# Patient Record
Sex: MALE | Race: WHITE | NOT HISPANIC OR LATINO | URBAN - METROPOLITAN AREA
[De-identification: names, ages, dates, MRNs, and addresses within clinical notes are randomized per-mention and may not be internally consistent; named-entity substitution may affect disease eponyms.]

---

## 2018-10-16 ENCOUNTER — INPATIENT (INPATIENT)
Facility: HOSPITAL | Age: 68
LOS: 0 days | Discharge: HOME | DRG: 247 | End: 2018-10-17
Attending: INTERNAL MEDICINE | Admitting: INTERNAL MEDICINE
Payer: COMMERCIAL

## 2018-10-16 VITALS
RESPIRATION RATE: 18 BRPM | DIASTOLIC BLOOD PRESSURE: 58 MMHG | OXYGEN SATURATION: 97 % | TEMPERATURE: 97 F | HEART RATE: 51 BPM | SYSTOLIC BLOOD PRESSURE: 127 MMHG

## 2018-10-16 DIAGNOSIS — Z95.5 PRESENCE OF CORONARY ANGIOPLASTY IMPLANT AND GRAFT: Chronic | ICD-10-CM

## 2018-10-16 DIAGNOSIS — E11.9 TYPE 2 DIABETES MELLITUS WITHOUT COMPLICATIONS: Chronic | ICD-10-CM

## 2018-10-16 DIAGNOSIS — R94.39 ABNORMAL RESULT OF OTHER CARDIOVASCULAR FUNCTION STUDY: ICD-10-CM

## 2018-10-16 DIAGNOSIS — I10 ESSENTIAL (PRIMARY) HYPERTENSION: Chronic | ICD-10-CM

## 2018-10-16 LAB
ANION GAP SERPL CALC-SCNC: 12 MMOL/L — SIGNIFICANT CHANGE UP (ref 5–17)
APTT BLD: 31.2 SEC — SIGNIFICANT CHANGE UP (ref 27.5–37.4)
BASOPHILS # BLD AUTO: 0 K/UL — SIGNIFICANT CHANGE UP (ref 0–0.2)
BASOPHILS NFR BLD AUTO: 0.7 % — SIGNIFICANT CHANGE UP (ref 0–2)
BLD GP AB SCN SERPL QL: SIGNIFICANT CHANGE UP
BUN SERPL-MCNC: 26 MG/DL — HIGH (ref 8–20)
CALCIUM SERPL-MCNC: 9.6 MG/DL — SIGNIFICANT CHANGE UP (ref 8.6–10.2)
CHLORIDE SERPL-SCNC: 99 MMOL/L — SIGNIFICANT CHANGE UP (ref 98–107)
CO2 SERPL-SCNC: 28 MMOL/L — SIGNIFICANT CHANGE UP (ref 22–29)
CREAT SERPL-MCNC: 0.89 MG/DL — SIGNIFICANT CHANGE UP (ref 0.5–1.3)
EOSINOPHIL # BLD AUTO: 0.2 K/UL — SIGNIFICANT CHANGE UP (ref 0–0.5)
EOSINOPHIL NFR BLD AUTO: 4 % — SIGNIFICANT CHANGE UP (ref 0–5)
GLUCOSE BLDC GLUCOMTR-MCNC: 148 MG/DL — HIGH (ref 70–99)
GLUCOSE BLDC GLUCOMTR-MCNC: 94 MG/DL — SIGNIFICANT CHANGE UP (ref 70–99)
GLUCOSE SERPL-MCNC: 164 MG/DL — HIGH (ref 70–115)
HCT VFR BLD CALC: 43.7 % — SIGNIFICANT CHANGE UP (ref 42–52)
HGB BLD-MCNC: 14 G/DL — SIGNIFICANT CHANGE UP (ref 14–18)
INR BLD: 1.02 RATIO — SIGNIFICANT CHANGE UP (ref 0.88–1.16)
LYMPHOCYTES # BLD AUTO: 1.5 K/UL — SIGNIFICANT CHANGE UP (ref 1–4.8)
LYMPHOCYTES # BLD AUTO: 24.1 % — SIGNIFICANT CHANGE UP (ref 20–55)
MCHC RBC-ENTMCNC: 28.4 PG — SIGNIFICANT CHANGE UP (ref 27–31)
MCHC RBC-ENTMCNC: 32 G/DL — SIGNIFICANT CHANGE UP (ref 32–36)
MCV RBC AUTO: 88.6 FL — SIGNIFICANT CHANGE UP (ref 80–94)
MONOCYTES # BLD AUTO: 0.7 K/UL — SIGNIFICANT CHANGE UP (ref 0–0.8)
MONOCYTES NFR BLD AUTO: 11.9 % — HIGH (ref 3–10)
NEUTROPHILS # BLD AUTO: 3.6 K/UL — SIGNIFICANT CHANGE UP (ref 1.8–8)
NEUTROPHILS NFR BLD AUTO: 59 % — SIGNIFICANT CHANGE UP (ref 37–73)
PLATELET # BLD AUTO: 289 K/UL — SIGNIFICANT CHANGE UP (ref 150–400)
POTASSIUM SERPL-MCNC: 4.4 MMOL/L — SIGNIFICANT CHANGE UP (ref 3.5–5.3)
POTASSIUM SERPL-SCNC: 4.4 MMOL/L — SIGNIFICANT CHANGE UP (ref 3.5–5.3)
PROTHROM AB SERPL-ACNC: 11.2 SEC — SIGNIFICANT CHANGE UP (ref 9.8–12.7)
RBC # BLD: 4.93 M/UL — SIGNIFICANT CHANGE UP (ref 4.6–6.2)
RBC # FLD: 13.1 % — SIGNIFICANT CHANGE UP (ref 11–15.6)
SODIUM SERPL-SCNC: 139 MMOL/L — SIGNIFICANT CHANGE UP (ref 135–145)
WBC # BLD: 6.1 K/UL — SIGNIFICANT CHANGE UP (ref 4.8–10.8)
WBC # FLD AUTO: 6.1 K/UL — SIGNIFICANT CHANGE UP (ref 4.8–10.8)

## 2018-10-16 PROCEDURE — 93010 ELECTROCARDIOGRAM REPORT: CPT | Mod: 76

## 2018-10-16 RX ORDER — ATORVASTATIN CALCIUM 80 MG/1
10 TABLET, FILM COATED ORAL AT BEDTIME
Qty: 0 | Refills: 0 | Status: DISCONTINUED | OUTPATIENT
Start: 2018-10-16 | End: 2018-10-17

## 2018-10-16 RX ORDER — DEXTROSE 50 % IN WATER 50 %
25 SYRINGE (ML) INTRAVENOUS ONCE
Qty: 0 | Refills: 0 | Status: DISCONTINUED | OUTPATIENT
Start: 2018-10-16 | End: 2018-10-17

## 2018-10-16 RX ORDER — METOPROLOL TARTRATE 50 MG
25 TABLET ORAL DAILY
Qty: 0 | Refills: 0 | Status: DISCONTINUED | OUTPATIENT
Start: 2018-10-16 | End: 2018-10-17

## 2018-10-16 RX ORDER — SODIUM CHLORIDE 9 MG/ML
1000 INJECTION, SOLUTION INTRAVENOUS
Qty: 0 | Refills: 0 | Status: DISCONTINUED | OUTPATIENT
Start: 2018-10-16 | End: 2018-10-17

## 2018-10-16 RX ORDER — DEXTROSE 50 % IN WATER 50 %
12.5 SYRINGE (ML) INTRAVENOUS ONCE
Qty: 0 | Refills: 0 | Status: DISCONTINUED | OUTPATIENT
Start: 2018-10-16 | End: 2018-10-17

## 2018-10-16 RX ORDER — ASPIRIN/CALCIUM CARB/MAGNESIUM 324 MG
325 TABLET ORAL DAILY
Qty: 0 | Refills: 0 | Status: DISCONTINUED | OUTPATIENT
Start: 2018-10-16 | End: 2018-10-17

## 2018-10-16 RX ORDER — CLOPIDOGREL BISULFATE 75 MG/1
75 TABLET, FILM COATED ORAL DAILY
Qty: 0 | Refills: 0 | Status: DISCONTINUED | OUTPATIENT
Start: 2018-10-16 | End: 2018-10-17

## 2018-10-16 RX ORDER — DEXTROSE 50 % IN WATER 50 %
15 SYRINGE (ML) INTRAVENOUS ONCE
Qty: 0 | Refills: 0 | Status: DISCONTINUED | OUTPATIENT
Start: 2018-10-16 | End: 2018-10-17

## 2018-10-16 RX ORDER — ASCORBIC ACID 60 MG
500 TABLET,CHEWABLE ORAL DAILY
Qty: 0 | Refills: 0 | Status: DISCONTINUED | OUTPATIENT
Start: 2018-10-16 | End: 2018-10-17

## 2018-10-16 RX ORDER — GLUCAGON INJECTION, SOLUTION 0.5 MG/.1ML
1 INJECTION, SOLUTION SUBCUTANEOUS ONCE
Qty: 0 | Refills: 0 | Status: DISCONTINUED | OUTPATIENT
Start: 2018-10-16 | End: 2018-10-17

## 2018-10-16 RX ORDER — SPIRONOLACTONE 25 MG/1
25 TABLET, FILM COATED ORAL DAILY
Qty: 0 | Refills: 0 | Status: DISCONTINUED | OUTPATIENT
Start: 2018-10-16 | End: 2018-10-17

## 2018-10-16 RX ORDER — SODIUM CHLORIDE 9 MG/ML
1000 INJECTION INTRAMUSCULAR; INTRAVENOUS; SUBCUTANEOUS
Qty: 0 | Refills: 0 | Status: COMPLETED | OUTPATIENT
Start: 2018-10-16 | End: 2018-10-16

## 2018-10-16 RX ORDER — LISINOPRIL 2.5 MG/1
20 TABLET ORAL DAILY
Qty: 0 | Refills: 0 | Status: DISCONTINUED | OUTPATIENT
Start: 2018-10-16 | End: 2018-10-17

## 2018-10-16 RX ORDER — FENTANYL CITRATE 50 UG/ML
25 INJECTION INTRAVENOUS ONCE
Qty: 0 | Refills: 0 | Status: DISCONTINUED | OUTPATIENT
Start: 2018-10-16 | End: 2018-10-17

## 2018-10-16 RX ORDER — ONDANSETRON 8 MG/1
4 TABLET, FILM COATED ORAL ONCE
Qty: 0 | Refills: 0 | Status: DISCONTINUED | OUTPATIENT
Start: 2018-10-16 | End: 2018-10-17

## 2018-10-16 RX ORDER — INSULIN LISPRO 100/ML
VIAL (ML) SUBCUTANEOUS
Qty: 0 | Refills: 0 | Status: DISCONTINUED | OUTPATIENT
Start: 2018-10-16 | End: 2018-10-17

## 2018-10-16 RX ADMIN — ATORVASTATIN CALCIUM 10 MILLIGRAM(S): 80 TABLET, FILM COATED ORAL at 22:38

## 2018-10-16 RX ADMIN — SODIUM CHLORIDE 30 MILLILITER(S): 9 INJECTION INTRAMUSCULAR; INTRAVENOUS; SUBCUTANEOUS at 12:19

## 2018-10-16 RX ADMIN — SODIUM CHLORIDE 30 MILLILITER(S): 9 INJECTION INTRAMUSCULAR; INTRAVENOUS; SUBCUTANEOUS at 15:33

## 2018-10-16 NOTE — DISCHARGE NOTE ADULT - NS AS ACTIVITY OBS
Walking-Indoors allowed/No Heavy lifting/straining/Do not drive or operate machinery/Showering allowed/Walking-Outdoors allowed

## 2018-10-16 NOTE — DISCHARGE NOTE ADULT - MEDICATION SUMMARY - MEDICATIONS TO TAKE
I will START or STAY ON the medications listed below when I get home from the hospital:    spironolactone 25 mg oral tablet  -- 1 tab(s) by mouth 2 times a day  -- Indication: For Hypertension    aspirin 325 mg oral tablet  -- 1 tab(s) by mouth once a day  -- Indication: For CAD (coronary artery disease)    metFORMIN 500 mg oral tablet  -- 1 tab(s) by mouth once a day (at bedtime)  -- Indication: For Diabetes.  May resume 10/19 in the am.    pioglitazone 30 mg oral tablet  -- 1 tab(s) by mouth once a day  -- Indication: For Diabetes    lovastatin 20 mg oral tablet  -- 1 tab(s) by mouth once a day  -- Indication: For HLD    quinapril-hydrochlorothiazide 20mg-25mg oral tablet  -- 1 tab(s) by mouth once a day  -- Indication: For HTN (hypertension)    clopidogrel 75 mg oral tablet  -- 1 tab(s) by mouth once a day  -- Indication: For CAD (coronary artery disease)    metoprolol succinate 25 mg oral tablet, extended release  -- 1 tab(s) by mouth once a day  -- Indication: For HTN (hypertension)    Vitamin B Complex oral tablet, extended release  -- 1 tab(s) by mouth once a day  -- Indication: For Supplement    ascorbic acid 500 mg oral tablet  -- 1 tab(s) by mouth once a day  -- Indication: For Supplment

## 2018-10-16 NOTE — DISCHARGE NOTE ADULT - PATIENT PORTAL LINK FT
You can access the [facility] Patient Portal, offered by [facility], by registering with the following website: [email or web address]

## 2018-10-16 NOTE — DISCHARGE NOTE ADULT - CARE PROVIDER_API CALL
Manjeet Stephens), Cardiovascular Disease; Interventional Cardiology  [address] [place] [address]  Phone: [phone]  Fax: [phone]

## 2018-10-16 NOTE — H&P PST ADULT - HISTORY OF PRESENT ILLNESS
68 yo male with pmhx CAD s/p stents, HTN, HLD, DM, CVA who presents for [facility]. He recently had an abnormal stress test with lateral ischemia. He had edema which was cleared with aldactone and now resolved. He needs pancreatic surgery and needs preop clearance. Denies chest pain, sob, palps.    ECHO: 10/4/2018: EF 70% Normal left ventricular systolic function, diastolic dysfunction, mild valvular abnormality.      Stress test: 9/2018: Small zone of decreased radiotracer uptake in high lateral segments.  EF 64%. Mid lateral ischemia.     Cardiac cath: 9/2006: LAD stent, mid lesion

## 2018-10-16 NOTE — DISCHARGE NOTE ADULT - HOSPITAL COURSE
S/P CELE to LAD, RFA with #6fr sheath 68 yo male with pmhx CAD s/p stents, HTN, HLD, DM, CVA who presents for [facility]. He recently had an abnormal stress test with lateral ischemia. He had edema which was cleared with aldactone and now resolved. He needs pancreatic surgery and needs preop clearance. Denies chest pain, sob, palps.    ECHO: 10/4/2018: EF 70% Normal left ventricular systolic function, diastolic dysfunction, mild valvular abnormality.      Stress test: 9/2018: Small zone of decreased radiotracer uptake in high lateral segments.  EF 64%. Mid lateral ischemia.       S/P CELE to LAD, RFA with #6fr sheath.  AM labs noted  12 lead EKG NSB HR 49  No acute ST changes.  Continue medications as prior including aspirin and plavix  Hold metformin for 2 days  Follow up with cardiology as outpatient in 7-10 days

## 2018-10-16 NOTE — CONSULT NOTE ADULT - SUBJECTIVE AND OBJECTIVE BOX
Patient is a 67y old  Male who presents with a chief complaint of dyspnea on exertion and abnormal nuclear stress test      HPI:  68 yo male with pmhx CAD s/p stents, HTN, HLD, DM, CVA who presents for evaluation of dyspnea on exertion. He recently had an abnormal stress test with lateral ischemia. He had edema which was cleared with aldactone and now resolved. He needs pancreatic surgery and needs preop clearance. Denies chest pain, sob, palps.    ECHO: 10/4/2018: EF 70% Normal left ventricular systolic function, diastolic dysfunction, mild valvular abnormality.      Stress test: 9/2018: Small zone of decreased radiotracer uptake in high lateral segments.  EF 64%. Mid lateral ischemia.     Cardiac cath: 9/2006: LAD stent, mid lesion (16 Oct 2018 11:02)      PAST MEDICAL & SURGICAL HISTORY:  Bell's palsy  CVA (cerebral vascular accident): x 3  Diabetes  CAD (coronary artery disease): stents x 2  Hypertension  HTN (hypertension)  DM (diabetes mellitus)  S/P coronary artery stent placement: x 2    Allergies    No Known Allergies    Intolerances        MEDICATIONS  (STANDING):  ascorbic acid 500 milliGRAM(s) Oral daily  aspirin 325 milliGRAM(s) Oral daily  atorvastatin 10 milliGRAM(s) Oral at bedtime  clopidogrel Tablet 75 milliGRAM(s) Oral daily  dextrose 5%. 1000 milliLiter(s) (50 mL/Hr) IV Continuous <Continuous>  dextrose 50% Injectable 12.5 Gram(s) IV Push once  dextrose 50% Injectable 25 Gram(s) IV Push once  dextrose 50% Injectable 25 Gram(s) IV Push once  fentaNYL    Injectable 25 MICROGram(s) IV Push once  insulin lispro (HumaLOG) corrective regimen sliding scale   SubCutaneous three times a day before meals  lisinopril 20 milliGRAM(s) Oral daily  metoprolol succinate ER 25 milliGRAM(s) Oral daily  ondansetron Injectable 4 milliGRAM(s) IV Push once  spironolactone 25 milliGRAM(s) Oral daily    MEDICATIONS  (PRN):  dextrose 40% Gel 15 Gram(s) Oral once PRN Blood Glucose LESS THAN 70 milliGRAM(s)/deciliter  glucagon  Injectable 1 milliGRAM(s) IntraMuscular once PRN Glucose LESS THAN 70 milligrams/deciliter    ascorbic acid 500 milliGRAM(s) Oral daily  aspirin 325 milliGRAM(s) Oral daily  atorvastatin 10 milliGRAM(s) Oral at bedtime  clopidogrel Tablet 75 milliGRAM(s) Oral daily  dextrose 40% Gel 15 Gram(s) Oral once PRN  dextrose 5%. 1000 milliLiter(s) IV Continuous <Continuous>  dextrose 50% Injectable 12.5 Gram(s) IV Push once  dextrose 50% Injectable 25 Gram(s) IV Push once  dextrose 50% Injectable 25 Gram(s) IV Push once  fentaNYL    Injectable 25 MICROGram(s) IV Push once  glucagon  Injectable 1 milliGRAM(s) IntraMuscular once PRN  insulin lispro (HumaLOG) corrective regimen sliding scale   SubCutaneous three times a day before meals  lisinopril 20 milliGRAM(s) Oral daily  metoprolol succinate ER 25 milliGRAM(s) Oral daily  ondansetron Injectable 4 milliGRAM(s) IV Push once  spironolactone 25 milliGRAM(s) Oral daily      FAMILY HISTORY:      SOCIAL HISTORY:    CIGARETTES: Former    ALCOHOL: Rare    REVIEW OF SYSTEMS:  CONSTITUTIONAL: No fever, weight loss, or fatigue  EYES: No eye pain, visual disturbances, or discharge  ENMT:  No difficulty hearing, tinnitus, vertigo; No sinus or throat pain  NECK: No pain or stiffness  RESPIRATORY: No cough, wheezing, chills or hemoptysis; No Shortness of Breath  CARDIOVASCULAR: No chest pain, palpitations, passing out, dizziness, or leg swelling  GASTROINTESTINAL: No abdominal or epigastric pain. No nausea, vomiting, or hematemesis; No diarrhea or constipation. No melena or hematochezia.  GENITOURINARY: No dysuria, frequency, hematuria, or incontinence  NEUROLOGICAL: No headaches, memory loss, loss of strength, numbness, or tremors  SKIN: No itching, burning, rashes, or lesions   LYMPH Nodes: No enlarged glands  ENDOCRINE: No heat or cold intolerance; No hair loss  MUSCULOSKELETAL: No joint pain or swelling; No muscle, back, or extremity pain  PSYCHIATRIC: No depression, anxiety, mood swings, or difficulty sleeping  HEME/LYMPH: No easy bruising, or bleeding gums  ALLERY AND IMMUNOLOGIC: No hives or eczema	    Vital Signs Last 24 Hrs  T(C): 36.2 (16 Oct 2018 10:23), Max: 36.2 (16 Oct 2018 10:23)  T(F): 97.2 (16 Oct 2018 10:23), Max: 97.2 (16 Oct 2018 10:23)  HR: 57 (16 Oct 2018 19:05) (51 - 78)  BP: 101/56 (16 Oct 2018 19:05) (101/56 - 143/63)  BP(mean): --  RR: 18 (16 Oct 2018 19:05) (18 - 18)  SpO2: 93% (16 Oct 2018 19:05) (93% - 98%)    Daily     Daily     I&O's Detail      PHYSICAL EXAM:  Appearance: Normal, well nourished	  HEENT:   Normal oral mucosa, PERRL, EOMI, sclera non-icteric	  Lymphatic: No cervical lymphadenopathy  Cardiovascular: Normal S1 S2, No JVD, No cardiac murmurs, No carotid bruits, No peripheral edema  Respiratory: Lungs clear to auscultation	  Psychiatry: A & O x 3, Mood & affect appropriate  Gastrointestinal:  Soft, Non-tender, + BS, no bruits	  Skin: No rashes, No ecchymoses, No cyanosis  Neurologic: Grossly non-focal with full strength in all four extremities  Extremities: Normal range of motion, No clubbing, cyanosis or edema  Vascular: Peripheral pulses palpable 2+ bilaterally      LABS:                        14.0   6.1   )-----------( 289      ( 16 Oct 2018 10:35 )             43.7     10-16    139  |  99  |  26.0<H>  ----------------------------<  164<H>  4.4   |  28.0  |  0.89    Ca    9.6      16 Oct 2018 10:35          PT/INR - ( 16 Oct 2018 10:35 )   PT: 11.2 sec;   INR: 1.02 ratio         PTT - ( 16 Oct 2018 10:35 )  PTT:31.2 sec    I&O's Summary    BNP  RADIOLOGY & ADDITIONAL STUDIES:    Assessment:  68 yo male with pmhx CAD s/p stents, HTN, HLD, DM, CVA who presents for evaluation of dyspnea on exertion. He recently had an abnormal stress test with lateral ischemia. He had edema which was cleared with aldactone and now resolved. He needs pancreatic surgery and needs preop clearance. Denies chest pain, sob, palps.      Plan:  Cardiac catheterization and possible percutaneous intervention recommended.  Risks, benefits, and alternatives reviewed.  Risks including but not limited to MI, death, stroke, bleeding, infection, vessel injury, hematoma, renal failure, allergic reaction, urgent open heart surgery, restenosis and stent thrombosis were reviewed.  All questions answered.  Patient is agreeable to proceed.

## 2018-10-16 NOTE — PROGRESS NOTE ADULT - SUBJECTIVE AND OBJECTIVE BOX
Subjective:  67y  Male S/P LHC CELE to LAD, RFA with #6fr sheath sutured in place. Patient awake and alert without complaints./ Denies chest pain, sob, palps.    PAST MEDICAL & SURGICAL HISTORY:  Bell's palsy  CVA (cerebral vascular accident): x 3  Diabetes  CAD (coronary artery disease): stents x 2  Hypertension  HTN (hypertension)  DM (diabetes mellitus)  S/P coronary artery stent placement: x 2    FAMILY HISTORY:    MEDICATIONS  (STANDING):  ascorbic acid 500 milliGRAM(s) Oral daily  aspirin 325 milliGRAM(s) Oral daily  atorvastatin 10 milliGRAM(s) Oral at bedtime  clopidogrel Tablet 75 milliGRAM(s) Oral daily  dextrose 5%. 1000 milliLiter(s) (50 mL/Hr) IV Continuous <Continuous>  dextrose 50% Injectable 12.5 Gram(s) IV Push once  dextrose 50% Injectable 25 Gram(s) IV Push once  dextrose 50% Injectable 25 Gram(s) IV Push once  insulin lispro (HumaLOG) corrective regimen sliding scale   SubCutaneous three times a day before meals  lisinopril 20 milliGRAM(s) Oral daily  metoprolol succinate ER 25 milliGRAM(s) Oral daily  spironolactone 25 milliGRAM(s) Oral daily        General: No fatigue, no fevers/chills  Respiratory: No dyspnea, no cough, no wheeze  CV: No chest pain, no palpitations  Abd: No nausea  Neuro: No headache, no dizziness  No Known Allergies      Objective:  Vital Signs Last 24 Hrs  T(C): 36.2 (16 Oct 2018 10:23), Max: 36.2 (16 Oct 2018 10:23)  T(F): 97.2 (16 Oct 2018 10:23), Max: 97.2 (16 Oct 2018 10:23)  HR: 68 (16 Oct 2018 15:41) (51 - 72)  BP: 115/58 (16 Oct 2018 15:41) (115/58 - 143/63)  BP(mean): --  RR: 18 (16 Oct 2018 15:41) (18 - 18)  SpO2: 98% (16 Oct 2018 15:41) (97% - 98%)    Neuro: A&OX3  Lungs: CTA B/L  CV: S1, S2, no murmur, RRR  Abd: Soft  Right Groin: Soft, no bleeding, no hematoma, no bruit #6fr sheath in place  Extremity: + distal pulses  EKG: NSR 1st degree AVB 77bpm                        14.0   6.1   )-----------( 289      ( 16 Oct 2018 10:35 )             43.7     10-16    139  |  99  |  26.0<H>  ----------------------------<  164<H>  4.4   |  28.0  |  0.89    Ca    9.6      16 Oct 2018 10:35      PT/INR - ( 16 Oct 2018 10:35 )   PT: 11.2 sec;   INR: 1.02 ratio         PTT - ( 16 Oct 2018 10:35 )  PTT:31.2 sec    A/P: CAD s/p PCI: CELE to LAD  1.                 Groin management discussed with patient.  2.                 Post procedure EKG reviewed and stable.    3.                 Pt given instructions on importance of taking antiplatelet medication.    4.                 Aspirin/Plavix/Statin/BB/ACE  5.                 Follow up with cardiologist in 2 weeks or sooner if needed  6.                 Bedrest x 6  hours  7.                 Remove sheath in 2 hours  8.                 Probable discharge in am

## 2018-10-16 NOTE — PROGRESS NOTE ADULT - SUBJECTIVE AND OBJECTIVE BOX
Removal of Femoral Sheath    Pulses in the right lower extremity are palpable. The patient was placed in the supine position. The insertion site was identified and the sutures were removed per protocol.  The _#6___ [place] femoral sheath was then removed. Direct pressure was applied for  __20____ minutes.     Monitoring of the right groin and both lower extremities including neuro-vascular checks and vital signs every 15 minutes x 4, then every 30 minutes x 2, then every 1 hour was ordered.    Complications: None/Other    Comments:

## 2018-10-16 NOTE — ASU PATIENT PROFILE, ADULT - PMH
Bell's palsy    CAD (coronary artery disease)  stents x 2  CVA (cerebral vascular accident)  x 3  Diabetes    Hypertension

## 2018-10-17 VITALS
OXYGEN SATURATION: 97 % | HEART RATE: 58 BPM | DIASTOLIC BLOOD PRESSURE: 61 MMHG | SYSTOLIC BLOOD PRESSURE: 128 MMHG | RESPIRATION RATE: 18 BRPM

## 2018-10-17 LAB
ANION GAP SERPL CALC-SCNC: 9 MMOL/L — SIGNIFICANT CHANGE UP (ref 5–17)
BASOPHILS # BLD AUTO: 0 K/UL — SIGNIFICANT CHANGE UP (ref 0–0.2)
BASOPHILS NFR BLD AUTO: 0.9 % — SIGNIFICANT CHANGE UP (ref 0–2)
BUN SERPL-MCNC: 19 MG/DL — SIGNIFICANT CHANGE UP (ref 8–20)
CALCIUM SERPL-MCNC: 9 MG/DL — SIGNIFICANT CHANGE UP (ref 8.6–10.2)
CHLORIDE SERPL-SCNC: 103 MMOL/L — SIGNIFICANT CHANGE UP (ref 98–107)
CO2 SERPL-SCNC: 26 MMOL/L — SIGNIFICANT CHANGE UP (ref 22–29)
CREAT SERPL-MCNC: 0.82 MG/DL — SIGNIFICANT CHANGE UP (ref 0.5–1.3)
EOSINOPHIL # BLD AUTO: 0.2 K/UL — SIGNIFICANT CHANGE UP (ref 0–0.5)
EOSINOPHIL NFR BLD AUTO: 3.7 % — SIGNIFICANT CHANGE UP (ref 0–5)
GLUCOSE BLDC GLUCOMTR-MCNC: 138 MG/DL — HIGH (ref 70–99)
GLUCOSE SERPL-MCNC: 200 MG/DL — HIGH (ref 70–115)
HBA1C BLD-MCNC: 7.4 % — HIGH (ref 4–5.6)
HCT VFR BLD CALC: 43 % — SIGNIFICANT CHANGE UP (ref 42–52)
HGB BLD-MCNC: 13.7 G/DL — LOW (ref 14–18)
LYMPHOCYTES # BLD AUTO: 1.5 K/UL — SIGNIFICANT CHANGE UP (ref 1–4.8)
LYMPHOCYTES # BLD AUTO: 27 % — SIGNIFICANT CHANGE UP (ref 20–55)
MCHC RBC-ENTMCNC: 28 PG — SIGNIFICANT CHANGE UP (ref 27–31)
MCHC RBC-ENTMCNC: 31.9 G/DL — LOW (ref 32–36)
MCV RBC AUTO: 87.9 FL — SIGNIFICANT CHANGE UP (ref 80–94)
MONOCYTES # BLD AUTO: 0.6 K/UL — SIGNIFICANT CHANGE UP (ref 0–0.8)
MONOCYTES NFR BLD AUTO: 10.3 % — HIGH (ref 3–10)
NEUTROPHILS # BLD AUTO: 3.3 K/UL — SIGNIFICANT CHANGE UP (ref 1.8–8)
NEUTROPHILS NFR BLD AUTO: 57.9 % — SIGNIFICANT CHANGE UP (ref 37–73)
PLATELET # BLD AUTO: 251 K/UL — SIGNIFICANT CHANGE UP (ref 150–400)
POTASSIUM SERPL-MCNC: 4.4 MMOL/L — SIGNIFICANT CHANGE UP (ref 3.5–5.3)
POTASSIUM SERPL-SCNC: 4.4 MMOL/L — SIGNIFICANT CHANGE UP (ref 3.5–5.3)
RBC # BLD: 4.89 M/UL — SIGNIFICANT CHANGE UP (ref 4.6–6.2)
RBC # FLD: 13.2 % — SIGNIFICANT CHANGE UP (ref 11–15.6)
SODIUM SERPL-SCNC: 138 MMOL/L — SIGNIFICANT CHANGE UP (ref 135–145)
WBC # BLD: 5.6 K/UL — SIGNIFICANT CHANGE UP (ref 4.8–10.8)
WBC # FLD AUTO: 5.6 K/UL — SIGNIFICANT CHANGE UP (ref 4.8–10.8)

## 2018-10-17 PROCEDURE — 93010 ELECTROCARDIOGRAM REPORT: CPT

## 2018-10-17 PROCEDURE — 85610 PROTHROMBIN TIME: CPT

## 2018-10-17 PROCEDURE — C9600: CPT | Mod: LD

## 2018-10-17 PROCEDURE — C1725: CPT

## 2018-10-17 PROCEDURE — C1874: CPT

## 2018-10-17 PROCEDURE — 93458 L HRT ARTERY/VENTRICLE ANGIO: CPT | Mod: XU

## 2018-10-17 PROCEDURE — 99152 MOD SED SAME PHYS/QHP 5/>YRS: CPT

## 2018-10-17 PROCEDURE — 83036 HEMOGLOBIN GLYCOSYLATED A1C: CPT

## 2018-10-17 PROCEDURE — 82962 GLUCOSE BLOOD TEST: CPT

## 2018-10-17 PROCEDURE — 85027 COMPLETE CBC AUTOMATED: CPT

## 2018-10-17 PROCEDURE — 86850 RBC ANTIBODY SCREEN: CPT

## 2018-10-17 PROCEDURE — 86900 BLOOD TYPING SEROLOGIC ABO: CPT

## 2018-10-17 PROCEDURE — 93005 ELECTROCARDIOGRAM TRACING: CPT

## 2018-10-17 PROCEDURE — 85730 THROMBOPLASTIN TIME PARTIAL: CPT

## 2018-10-17 PROCEDURE — 99153 MOD SED SAME PHYS/QHP EA: CPT

## 2018-10-17 PROCEDURE — 80048 BASIC METABOLIC PNL TOTAL CA: CPT

## 2018-10-17 PROCEDURE — C1887: CPT

## 2018-10-17 PROCEDURE — 86901 BLOOD TYPING SEROLOGIC RH(D): CPT

## 2018-10-17 PROCEDURE — C1769: CPT

## 2018-10-17 PROCEDURE — 36415 COLL VENOUS BLD VENIPUNCTURE: CPT

## 2018-10-17 RX ORDER — METFORMIN HYDROCHLORIDE 850 MG/1
0 TABLET ORAL
Qty: 0 | Refills: 0 | COMMUNITY

## 2018-10-17 RX ORDER — SPIRONOLACTONE 25 MG/1
0 TABLET, FILM COATED ORAL
Qty: 0 | Refills: 0 | COMMUNITY

## 2018-10-17 RX ORDER — PIOGLITAZONE HYDROCHLORIDE 15 MG/1
1 TABLET ORAL
Qty: 0 | Refills: 0 | COMMUNITY
Start: 2018-10-17

## 2018-10-17 RX ORDER — CLOPIDOGREL BISULFATE 75 MG/1
1 TABLET, FILM COATED ORAL
Qty: 0 | Refills: 0 | COMMUNITY

## 2018-10-17 RX ORDER — METOPROLOL TARTRATE 50 MG
1 TABLET ORAL
Qty: 0 | Refills: 0 | COMMUNITY

## 2018-10-17 RX ORDER — ASCORBIC ACID 60 MG
1 TABLET,CHEWABLE ORAL
Qty: 0 | Refills: 0 | COMMUNITY
Start: 2018-10-17

## 2018-10-17 RX ORDER — ASPIRIN/CALCIUM CARB/MAGNESIUM 324 MG
1 TABLET ORAL
Qty: 0 | Refills: 0 | COMMUNITY

## 2018-10-17 RX ORDER — METFORMIN HYDROCHLORIDE 850 MG/1
1 TABLET ORAL
Qty: 0 | Refills: 0 | COMMUNITY
Start: 2018-10-17

## 2018-10-17 RX ORDER — PIOGLITAZONE HYDROCHLORIDE 15 MG/1
1 TABLET ORAL
Qty: 0 | Refills: 0 | COMMUNITY

## 2018-10-17 RX ORDER — SPIRONOLACTONE 25 MG/1
1 TABLET, FILM COATED ORAL
Qty: 0 | Refills: 0 | COMMUNITY
Start: 2018-10-17

## 2018-10-17 RX ORDER — BENAZEPRIL HYDROCHLORIDE AND HYDROCHLOROTHIAZIDE 10; 12.5 MG/1; MG/1
1 TABLET, FILM COATED ORAL
Qty: 0 | Refills: 0 | COMMUNITY

## 2018-10-17 RX ORDER — LOVASTATIN 20 MG
1 TABLET ORAL
Qty: 0 | Refills: 0 | COMMUNITY
Start: 2018-10-17

## 2018-10-17 RX ORDER — CLOPIDOGREL BISULFATE 75 MG/1
1 TABLET, FILM COATED ORAL
Qty: 0 | Refills: 0 | COMMUNITY
Start: 2018-10-17

## 2018-10-17 RX ORDER — METOPROLOL TARTRATE 50 MG
1 TABLET ORAL
Qty: 0 | Refills: 0 | COMMUNITY
Start: 2018-10-17

## 2018-10-17 RX ORDER — LOVASTATIN 20 MG
1 TABLET ORAL
Qty: 0 | Refills: 0 | COMMUNITY

## 2018-10-17 RX ORDER — ASCORBIC ACID 60 MG
1 TABLET,CHEWABLE ORAL
Qty: 0 | Refills: 0 | COMMUNITY

## 2018-10-17 RX ORDER — ASPIRIN/CALCIUM CARB/MAGNESIUM 324 MG
1 TABLET ORAL
Qty: 0 | Refills: 0 | COMMUNITY
Start: 2018-10-17

## 2018-10-17 RX ADMIN — SPIRONOLACTONE 25 MILLIGRAM(S): 25 TABLET, FILM COATED ORAL at 06:15

## 2018-10-17 RX ADMIN — LISINOPRIL 20 MILLIGRAM(S): 2.5 TABLET ORAL at 06:15

## 2018-10-18 PROBLEM — I10 ESSENTIAL (PRIMARY) HYPERTENSION: Chronic | Status: ACTIVE | Noted: 2018-10-16

## 2018-10-18 PROBLEM — I25.10 ATHEROSCLEROTIC HEART DISEASE OF NATIVE CORONARY ARTERY WITHOUT ANGINA PECTORIS: Chronic | Status: ACTIVE | Noted: 2018-10-16

## 2018-10-18 PROBLEM — G51.0 BELL'S PALSY: Chronic | Status: ACTIVE | Noted: 2018-10-16

## 2018-10-18 PROBLEM — I63.9 CEREBRAL INFARCTION, UNSPECIFIED: Chronic | Status: ACTIVE | Noted: 2018-10-16

## 2018-10-18 PROBLEM — E11.9 TYPE 2 DIABETES MELLITUS WITHOUT COMPLICATIONS: Chronic | Status: ACTIVE | Noted: 2018-10-16

## 2018-10-23 ENCOUNTER — INPATIENT (INPATIENT)
Facility: HOSPITAL | Age: 68
LOS: 0 days | Discharge: HOME | DRG: 247 | End: 2018-10-24
Attending: INTERNAL MEDICINE | Admitting: INTERNAL MEDICINE
Payer: COMMERCIAL

## 2018-10-23 VITALS
SYSTOLIC BLOOD PRESSURE: 122 MMHG | OXYGEN SATURATION: 97 % | RESPIRATION RATE: 20 BRPM | TEMPERATURE: 98 F | DIASTOLIC BLOOD PRESSURE: 56 MMHG | HEART RATE: 50 BPM

## 2018-10-23 DIAGNOSIS — I10 ESSENTIAL (PRIMARY) HYPERTENSION: Chronic | ICD-10-CM

## 2018-10-23 DIAGNOSIS — I25.10 ATHEROSCLEROTIC HEART DISEASE OF NATIVE CORONARY ARTERY WITHOUT ANGINA PECTORIS: ICD-10-CM

## 2018-10-23 DIAGNOSIS — E11.9 TYPE 2 DIABETES MELLITUS WITHOUT COMPLICATIONS: Chronic | ICD-10-CM

## 2018-10-23 DIAGNOSIS — R07.9 CHEST PAIN, UNSPECIFIED: ICD-10-CM

## 2018-10-23 DIAGNOSIS — Z95.5 PRESENCE OF CORONARY ANGIOPLASTY IMPLANT AND GRAFT: Chronic | ICD-10-CM

## 2018-10-23 LAB
ANION GAP SERPL CALC-SCNC: 11 MMOL/L — SIGNIFICANT CHANGE UP (ref 5–17)
ANION GAP SERPL CALC-SCNC: 13 MMOL/L — SIGNIFICANT CHANGE UP (ref 5–17)
BLD GP AB SCN SERPL QL: SIGNIFICANT CHANGE UP
BUN SERPL-MCNC: 31 MG/DL — HIGH (ref 8–20)
BUN SERPL-MCNC: 31 MG/DL — HIGH (ref 8–20)
CALCIUM SERPL-MCNC: 10.2 MG/DL — SIGNIFICANT CHANGE UP (ref 8.6–10.2)
CALCIUM SERPL-MCNC: 9.4 MG/DL — SIGNIFICANT CHANGE UP (ref 8.6–10.2)
CHLORIDE SERPL-SCNC: 101 MMOL/L — SIGNIFICANT CHANGE UP (ref 98–107)
CHLORIDE SERPL-SCNC: 104 MMOL/L — SIGNIFICANT CHANGE UP (ref 98–107)
CO2 SERPL-SCNC: 24 MMOL/L — SIGNIFICANT CHANGE UP (ref 22–29)
CO2 SERPL-SCNC: 25 MMOL/L — SIGNIFICANT CHANGE UP (ref 22–29)
CREAT SERPL-MCNC: 0.8 MG/DL — SIGNIFICANT CHANGE UP (ref 0.5–1.3)
CREAT SERPL-MCNC: 0.92 MG/DL — SIGNIFICANT CHANGE UP (ref 0.5–1.3)
GLUCOSE BLDC GLUCOMTR-MCNC: 91 MG/DL — SIGNIFICANT CHANGE UP (ref 70–99)
GLUCOSE SERPL-MCNC: 138 MG/DL — HIGH (ref 70–115)
GLUCOSE SERPL-MCNC: 147 MG/DL — HIGH (ref 70–115)
HCT VFR BLD CALC: 45 % — SIGNIFICANT CHANGE UP (ref 42–52)
HGB BLD-MCNC: 14.6 G/DL — SIGNIFICANT CHANGE UP (ref 14–18)
INR BLD: 0.99 RATIO — SIGNIFICANT CHANGE UP (ref 0.88–1.16)
MCHC RBC-ENTMCNC: 27.5 PG — SIGNIFICANT CHANGE UP (ref 27–31)
MCHC RBC-ENTMCNC: 32.4 G/DL — SIGNIFICANT CHANGE UP (ref 32–36)
MCV RBC AUTO: 84.9 FL — SIGNIFICANT CHANGE UP (ref 80–94)
PLATELET # BLD AUTO: 300 K/UL — SIGNIFICANT CHANGE UP (ref 150–400)
POTASSIUM SERPL-MCNC: 4.4 MMOL/L — SIGNIFICANT CHANGE UP (ref 3.5–5.3)
POTASSIUM SERPL-MCNC: 5.8 MMOL/L — HIGH (ref 3.5–5.3)
POTASSIUM SERPL-SCNC: 4.4 MMOL/L — SIGNIFICANT CHANGE UP (ref 3.5–5.3)
POTASSIUM SERPL-SCNC: 5.8 MMOL/L — HIGH (ref 3.5–5.3)
PROTHROM AB SERPL-ACNC: 10.9 SEC — SIGNIFICANT CHANGE UP (ref 9.8–12.7)
RBC # BLD: 5.3 M/UL — SIGNIFICANT CHANGE UP (ref 4.6–6.2)
RBC # FLD: 13.3 % — SIGNIFICANT CHANGE UP (ref 11–15.6)
SODIUM SERPL-SCNC: 137 MMOL/L — SIGNIFICANT CHANGE UP (ref 135–145)
SODIUM SERPL-SCNC: 141 MMOL/L — SIGNIFICANT CHANGE UP (ref 135–145)
TYPE + AB SCN PNL BLD: SIGNIFICANT CHANGE UP
WBC # BLD: 6.5 K/UL — SIGNIFICANT CHANGE UP (ref 4.8–10.8)
WBC # FLD AUTO: 6.5 K/UL — SIGNIFICANT CHANGE UP (ref 4.8–10.8)

## 2018-10-23 PROCEDURE — 93010 ELECTROCARDIOGRAM REPORT: CPT

## 2018-10-23 RX ORDER — AMLODIPINE BESYLATE 2.5 MG/1
1 TABLET ORAL
Qty: 0 | Refills: 0 | COMMUNITY

## 2018-10-23 RX ORDER — SODIUM CHLORIDE 9 MG/ML
250 INJECTION INTRAMUSCULAR; INTRAVENOUS; SUBCUTANEOUS
Qty: 0 | Refills: 0 | Status: DISCONTINUED | OUTPATIENT
Start: 2018-10-23 | End: 2018-10-24

## 2018-10-23 RX ORDER — HYDRALAZINE HCL 50 MG
25 TABLET ORAL
Qty: 0 | Refills: 0 | Status: DISCONTINUED | OUTPATIENT
Start: 2018-10-23 | End: 2018-10-24

## 2018-10-23 RX ORDER — SPIRONOLACTONE 25 MG/1
25 TABLET, FILM COATED ORAL DAILY
Qty: 0 | Refills: 0 | Status: DISCONTINUED | OUTPATIENT
Start: 2018-10-23 | End: 2018-10-24

## 2018-10-23 RX ORDER — METOPROLOL TARTRATE 50 MG
25 TABLET ORAL DAILY
Qty: 0 | Refills: 0 | Status: DISCONTINUED | OUTPATIENT
Start: 2018-10-23 | End: 2018-10-24

## 2018-10-23 RX ORDER — ASPIRIN/CALCIUM CARB/MAGNESIUM 324 MG
325 TABLET ORAL DAILY
Qty: 0 | Refills: 0 | Status: DISCONTINUED | OUTPATIENT
Start: 2018-10-23 | End: 2018-10-24

## 2018-10-23 RX ORDER — BENAZEPRIL HYDROCHLORIDE AND HYDROCHLOROTHIAZIDE 10; 12.5 MG/1; MG/1
1 TABLET, FILM COATED ORAL
Qty: 0 | Refills: 0 | COMMUNITY

## 2018-10-23 RX ORDER — HYDRALAZINE HCL 50 MG
0 TABLET ORAL
Qty: 0 | Refills: 0 | COMMUNITY

## 2018-10-23 RX ORDER — CLOPIDOGREL BISULFATE 75 MG/1
75 TABLET, FILM COATED ORAL DAILY
Qty: 0 | Refills: 0 | Status: DISCONTINUED | OUTPATIENT
Start: 2018-10-23 | End: 2018-10-24

## 2018-10-23 RX ORDER — AMLODIPINE BESYLATE 2.5 MG/1
10 TABLET ORAL DAILY
Qty: 0 | Refills: 0 | Status: DISCONTINUED | OUTPATIENT
Start: 2018-10-23 | End: 2018-10-24

## 2018-10-23 RX ORDER — ATORVASTATIN CALCIUM 80 MG/1
10 TABLET, FILM COATED ORAL AT BEDTIME
Qty: 0 | Refills: 0 | Status: DISCONTINUED | OUTPATIENT
Start: 2018-10-23 | End: 2018-10-24

## 2018-10-23 RX ADMIN — SODIUM CHLORIDE 250 MILLILITER(S): 9 INJECTION INTRAMUSCULAR; INTRAVENOUS; SUBCUTANEOUS at 11:53

## 2018-10-23 RX ADMIN — ATORVASTATIN CALCIUM 10 MILLIGRAM(S): 80 TABLET, FILM COATED ORAL at 21:55

## 2018-10-23 RX ADMIN — Medication 25 MILLIGRAM(S): at 21:55

## 2018-10-23 NOTE — DISCHARGE NOTE ADULT - NS AS DC AMI YN
Continue PO/NG feeds 50mL q3h over 1 hour on the pump of Neosure 24cal/oz  Encourge PO feeds per cues once/shift  Continue (OT) to improve po intake no

## 2018-10-23 NOTE — DISCHARGE NOTE ADULT - MEDICATION SUMMARY - MEDICATIONS TO TAKE
I will START or STAY ON the medications listed below when I get home from the hospital:    spironolactone 25 mg oral tablet  -- 1 tab(s) by mouth 2 times a day  -- Indication: For diuretic    aspirin 325 mg oral tablet  -- 1 tab(s) by mouth once a day  -- Indication: For CAD with stents    metFORMIN 500 mg oral tablet  -- 1 tab(s) by mouth once a day (at bedtime)  -- Indication: For DM    pioglitazone 30 mg oral tablet  -- 1 tab(s) by mouth once a day  -- Indication: For DM    lovastatin 20 mg oral tablet  -- 1 tab(s) by mouth once a day  -- Indication: For HLD    benazepril-hydrochlorothiazide 20 mg-25 mg oral tablet  -- 1 tab(s) by mouth once a day  -- Indication: For CAD (coronary artery disease)    clopidogrel 75 mg oral tablet  -- 1 tab(s) by mouth once a day  -- Indication: For CAD with stents    metoprolol succinate 25 mg oral tablet, extended release  -- 1 tab(s) by mouth once a day  -- Indication: For HTN    amLODIPine 10 mg oral tablet  -- 1 tab(s) by mouth once a day  -- Indication: For HTN    hydrALAZINE 25 mg oral tablet  -- orally 2 times a day  -- Indication: For HTN    Vitamin B Complex oral tablet, extended release  -- 1 tab(s) by mouth once a day  -- Indication: For supplement    ascorbic acid 500 mg oral tablet  -- 1 tab(s) by mouth once a day  -- Indication: For supplement

## 2018-10-23 NOTE — PROGRESS NOTE ADULT - ASSESSMENT
66 yo male with pmhx CAD s/p stents, HTN, HLD, DM, CVA( x 3 times ) who presents for [facility] for staged procedure . He recently had an abnormal stress test with lateral ischemia.  He was recently admitted to [facility] with a kidney stone on CT of abdomin 3  nodules were  found  with one that is concerning . He needs a pancreatic surgery and needs preop clearance. He was here on 10/16 and received a Cardiac cath. he had multivessel disease and received a CELE to  Mid LAD lesion He  returns today for staged procedure of RCA.   Currently feels well  He reports some intermittent brief MSCP that he attributes to arthritis . pain is non-radiating at rest  lasting only a  few minutes     He has been taking his medications and reports no other symptoms     s/p LHC and intervention via Left groin   s/p 2 stents RCA

## 2018-10-23 NOTE — H&P PST ADULT - ASSESSMENT
This is a 67 year old male with DM HTN HL recent LHC with multi-vessel s/p LAD CELE   Pt presents today for staged procedure of RCA     Currently feels well

## 2018-10-23 NOTE — DISCHARGE NOTE ADULT - NS AS ACTIVITY OBS
No future appointments.   LOV 10/11/2017 Showering allowed/No Heavy lifting/straining/Walking-Indoors allowed

## 2018-10-23 NOTE — DISCHARGE NOTE ADULT - MEDICATION SUMMARY - MEDICATIONS TO STOP TAKING
I will STOP taking the medications listed below when I get home from the hospital:    quinapril-hydrochlorothiazide 20mg-25mg oral tablet  -- 1 tab(s) by mouth once a day

## 2018-10-23 NOTE — PROGRESS NOTE ADULT - SUBJECTIVE AND OBJECTIVE BOX
Subjective:    Medications:  amLODIPine   Tablet 10 milliGRAM(s) Oral daily  aspirin 325 milliGRAM(s) Oral daily  atorvastatin 10 milliGRAM(s) Oral at bedtime  clopidogrel Tablet 75 milliGRAM(s) Oral daily  hydrALAZINE 25 milliGRAM(s) Oral two times a day  metoprolol succinate ER 25 milliGRAM(s) Oral daily  sodium chloride 0.9%. 250 milliLiter(s) IV Continuous <Continuous>  spironolactone 25 milliGRAM(s) Oral daily      PHYSICAL EXAM:  Vital Signs Last 24 Hrs  T(C): 36.5 (23 Oct 2018 11:00), Max: 36.5 (23 Oct 2018 11:00)  T(F): 97.7 (23 Oct 2018 11:00), Max: 97.7 (23 Oct 2018 11:00)  HR: 54 (23 Oct 2018 14:30) (50 - 75)  BP: 123/60 (23 Oct 2018 14:30) (122/56 - 125/55)  BP(mean): --  RR: 18 (23 Oct 2018 14:30) (18 - 20)  SpO2: 97% (23 Oct 2018 14:30) (97% - 98%)  Daily     Daily   I&O's Detail      General: A/ox 3, No acute Distress  Neck: Supple, no  JVD  Cardiac: S1 S2, M/R/G  Pulmonary: CTAB, Breathing unlabored, No Rhonchi/Rales/Wheezing  Abdomen: Soft, Non -tender, +BS   Extremities: No Rashes, No edema  Left femoral cath site with Angio- seal with hemostasis maintain  Neuro: A/o x 3, No focal deficits  Psch: normal mood , normal affect    LABS:                          14.6   6.5   )-----------( 300      ( 23 Oct 2018 11:00 )             45.0     10-23    141  |  104  |  31.0<H>  ----------------------------<  138<H>  4.4   |  24.0  |  0.80    Ca    9.4      23 Oct 2018 12:30      PT/INR - ( 23 Oct 2018 11:00 )   PT: 10.9 sec;   INR: 0.99 ratio

## 2018-10-23 NOTE — DISCHARGE NOTE ADULT - PATIENT PORTAL LINK FT
You can access the [facility] Patient Portal, offered by [facility], by registering with the following website: [email or web address]

## 2018-10-23 NOTE — DISCHARGE NOTE ADULT - HOSPITAL COURSE
68 yo male with pmhx CAD s/p stents, HTN, HLD, DM, CVA( x 3 times ) who presents for [facility] for staged procedure . He recently had an abnormal stress test with lateral ischemia.  He was recently admitted to [facility] with a kidney stone on CT of abdomin 3  nodules were  found  with one that is concerning . He needs a pancreatic surgery and needs preop clearance. He was here on 10/16 and received a Cardiac cath. he had multivessel disease and received a CELE to  Mid LAD lesion He  returns today for staged procedure of RCA.   Currently feels well  He reports some intermittent brief MSCP that he attributes to arthritis . pain is non-radiating at rest  lasting only a  few minutes     He has been taking his medications and reports no other symtoms   ECHO: 10/4/2018: EF 70% Normal left ventricular systolic function, diastolic dysfunction, mild valvular abnormality.      Stress test: 9/2018: Small zone of decreased radiotracer uptake in high lateral segments.  EF 64%. Mid lateral ischemia.     Hospital course:  denies complaints of chest pain/sob/dizziness/palps overnight   tolerated diet/ ambulating     SP: [facility] which revealed:  staged PCI/CELE x2 to distal and mid RCA  ASSESSMENT:  67M presents for staged PCI /SP [facility] 10/16/18 with CELE mLAD/ recent abn stress test  Sp: [facility] 10/23/18 CELE x 2 to RCA   HX; CVA/ CAD with stents/ HTN/HKD/DM2  Hemodynamically stable overnight ekg and labs reviewed no arrhymias    Plan:  Continue current meds ASA/ Plavix/BB / statin  stable for d/c home  med compliance/ groin care and follow up discussed

## 2018-10-23 NOTE — DISCHARGE NOTE ADULT - CARE PLAN
Principal Discharge DX:	CAD (coronary artery disease)  Goal:	maintain cardiac health  Assessment and plan of treatment:	Do Not STOP Aspirin or Plavix unless instructed by cardiologist only  No metformin for 2 days restart on 10/26 am   Continue previous medications   follow up with Cardiologist Principal Discharge DX:	CAD (coronary artery disease)  Goal:	maintain cardiac health  Assessment and plan of treatment:	Do Not STOP Aspirin or Plavix unless instructed by cardiologist only  No metformin for 2 days restart on 10/26 am   Continue previous medications   follow up with Cardiologist  Assessment and plan of treatment:	follow up with your primary MD within one week.  Return to the Emergency dept. for any chest pain or shortness of breath

## 2018-10-23 NOTE — H&P PST ADULT - HISTORY OF PRESENT ILLNESS
66 yo male with pmhx CAD s/p stents, HTN, HLD, DM, CVA who presents for [facility] for staged procedure . He recently had an abnormal stress test with lateral ischemia. He had edema which was cleared with aldactone and now resolved. He needs pancreatic surgery and needs preop clearance. He was here on 1016 and received a Cardiac cath and stent to Mid Lad returns today for staged procedure of RCA   Currently feels well   ECHO: 10/4/2018: EF 70% Normal left ventricular systolic function, diastolic dysfunction, mild valvular abnormality.      Stress test: 9/2018: Small zone of decreased radiotracer uptake in high lateral segments.  EF 64%. Mid lateral ischemia.     Cardiac cath 10/16/18   Cardiac Cath Lab - Adult (10.16.18 @ 14:02) >  LM:   --  LM: Normal.LAD:   --  Proximal LAD: There was a tubular 0 % stenosis at the site of a  prior stent.  --  Mid LAD: There was a discrete 85 % stenosis. In a second lesion, there  was a tubular 0 % stenosis at the site of a prior stent.  CX:   --  Circumflex: Normal.RI:   --  Ramus intermedius: Normal.  RCA:   --  Mid RCA: There was a tubular 85 % stenosis.  --  Distal RCA: There was a discrete 85 % stenosis.  1ST LESION INTERVENTIONS: A successful drug-eluting stent was performed on  the 85 % lesion in the mid LAD. Following intervention there was an  excellent angiographic appearance with a 0 % residual stenosis.  DIAGNOSTIC RECOMMENDATIONS: Patient will return for PCI of the RCA in a  week or so.  INTERVENTIONAL RECOMMENDATIONS: Patient will return for PCI of the RCA in a  week or so. Continue aspirin, 81 mg, PO, daily. Continue clopidogrel  (Plavix), 75 mg, PO, daily. 66 yo male with pmhx CAD s/p stents, HTN, HLD, DM, CVA( x 3 times ) who presents for [facility] for staged procedure . He recently had an abnormal stress test with lateral ischemia.  He was recently admitted to [facility] with a kidney stone on CT of abdomin 3  nodules were  found  with one that is concerning . He needs a pancreatic surgery and needs preop clearance. He was here on 10/16 and received a Cardiac cath. he had multivessel disease and received a CELE to  Mid LAD lesion He  returns today for staged procedure of RCA.   Currently feels well  He reports some intermittent brief MSCP that he attributes to arthritis . pain is non-radiating at rest  lasting only a  few minutes     He has been taking his medications and reports no other symtoms       ECHO: 10/4/2018: EF 70% Normal left ventricular systolic function, diastolic dysfunction, mild valvular abnormality.      Stress test: 9/2018: Small zone of decreased radiotracer uptake in high lateral segments.  EF 64%. Mid lateral ischemia.     Cardiac cath 10/16/18 EF 65 %   LM:   --  LM: Normal.LAD:   --  Proximal LAD: There was a tubular 0 % stenosis at the site of a  prior stent.  --  Mid LAD: There was a discrete 85 % stenosis. In a second lesion, there  was a tubular 0 % stenosis at the site of a prior stent.  CX:   --  Circumflex: Normal.RI:   --  Ramus intermedius: Normal.  RCA:   --  Mid RCA: There was a tubular 85 % stenosis.  --  Distal RCA: There was a discrete 85 % stenosis.  1ST LESION INTERVENTIONS: A successful drug-eluting stent was performed on  the 85 % lesion in the mid LAD. Following intervention there was an  excellent angiographic appearance with a 0 % residual stenosis.  DIAGNOSTIC RECOMMENDATIONS: Patient will return for PCI of the RCA in a  week or so.  INTERVENTIONAL RECOMMENDATIONS: Patient will return for PCI of the RCA in a  week or so. Continue aspirin, 81 mg, PO, daily. Continue clopidogrel  (Plavix), 75 mg, PO, daily.

## 2018-10-23 NOTE — PROGRESS NOTE ADULT - PROBLEM SELECTOR PLAN 1
Admit to hospital due to hospital due to staged procedure 3 previous strokes and need for pancreatic  surgery   Post cardiac cath orders and observations   Groin precautions   ASA/ plaviX  reinforced   Continue stain and beta blocker   bedrest for 3 hours   procedure results meds and follow up d/w pt   pt to follow up with Cardiologist 1-2 weeks post discharge

## 2018-10-23 NOTE — DISCHARGE NOTE ADULT - PLAN OF CARE
maintain cardiac health Do Not STOP Aspirin or Plavix unless instructed by cardiologist only  No metformin for 2 days restart on 10/26 am   Continue previous medications   follow up with Cardiologist follow up with your primary MD within one week.  Return to the Emergency dept. for any chest pain or shortness of breath

## 2018-10-23 NOTE — H&P PST ADULT - PROBLEM SELECTOR PLAN 1
PRE-PROCEDURE ASSESSMENT  Staged intervention of RCA   -Patient seen and examined  -Labs reviewed  -Pre-procedure teaching completed with patient and family  -Informed consent witnessed  -Questions answered  _ Pt took ASA/Plavix today   - No Metformin for 2 days  _ Iv hydration

## 2018-10-23 NOTE — DISCHARGE NOTE ADULT - CARE PROVIDER_API CALL
Manjeet Stephens), Cardiovascular Disease; Interventional Cardiology  [address] [place] [address]  Phone: [phone]  Fax: [phone]

## 2018-10-24 VITALS
RESPIRATION RATE: 16 BRPM | HEART RATE: 64 BPM | OXYGEN SATURATION: 99 % | TEMPERATURE: 98 F | DIASTOLIC BLOOD PRESSURE: 56 MMHG | SYSTOLIC BLOOD PRESSURE: 110 MMHG

## 2018-10-24 LAB
ANION GAP SERPL CALC-SCNC: 10 MMOL/L — SIGNIFICANT CHANGE UP (ref 5–17)
BASOPHILS # BLD AUTO: 0 K/UL — SIGNIFICANT CHANGE UP (ref 0–0.2)
BASOPHILS NFR BLD AUTO: 0.5 % — SIGNIFICANT CHANGE UP (ref 0–2)
BUN SERPL-MCNC: 27 MG/DL — HIGH (ref 8–20)
CALCIUM SERPL-MCNC: 9.1 MG/DL — SIGNIFICANT CHANGE UP (ref 8.6–10.2)
CHLORIDE SERPL-SCNC: 99 MMOL/L — SIGNIFICANT CHANGE UP (ref 98–107)
CO2 SERPL-SCNC: 26 MMOL/L — SIGNIFICANT CHANGE UP (ref 22–29)
CREAT SERPL-MCNC: 0.83 MG/DL — SIGNIFICANT CHANGE UP (ref 0.5–1.3)
EOSINOPHIL # BLD AUTO: 0.3 K/UL — SIGNIFICANT CHANGE UP (ref 0–0.5)
EOSINOPHIL NFR BLD AUTO: 3.2 % — SIGNIFICANT CHANGE UP (ref 0–6)
GLUCOSE SERPL-MCNC: 141 MG/DL — HIGH (ref 70–115)
HCT VFR BLD CALC: 39.8 % — LOW (ref 42–52)
HGB BLD-MCNC: 12.9 G/DL — LOW (ref 14–18)
LYMPHOCYTES # BLD AUTO: 1.4 K/UL — SIGNIFICANT CHANGE UP (ref 1–4.8)
LYMPHOCYTES # BLD AUTO: 17.6 % — LOW (ref 20–55)
MCHC RBC-ENTMCNC: 27.4 PG — SIGNIFICANT CHANGE UP (ref 27–31)
MCHC RBC-ENTMCNC: 32.4 G/DL — SIGNIFICANT CHANGE UP (ref 32–36)
MCV RBC AUTO: 84.7 FL — SIGNIFICANT CHANGE UP (ref 80–94)
MONOCYTES # BLD AUTO: 1 K/UL — HIGH (ref 0–0.8)
MONOCYTES NFR BLD AUTO: 11.9 % — HIGH (ref 3–10)
NEUTROPHILS # BLD AUTO: 5.5 K/UL — SIGNIFICANT CHANGE UP (ref 1.8–8)
NEUTROPHILS NFR BLD AUTO: 66.3 % — SIGNIFICANT CHANGE UP (ref 37–73)
PLATELET # BLD AUTO: 269 K/UL — SIGNIFICANT CHANGE UP (ref 150–400)
POTASSIUM SERPL-MCNC: 4.6 MMOL/L — SIGNIFICANT CHANGE UP (ref 3.5–5.3)
POTASSIUM SERPL-SCNC: 4.6 MMOL/L — SIGNIFICANT CHANGE UP (ref 3.5–5.3)
RBC # BLD: 4.7 M/UL — SIGNIFICANT CHANGE UP (ref 4.6–6.2)
RBC # FLD: 13.1 % — SIGNIFICANT CHANGE UP (ref 11–15.6)
SODIUM SERPL-SCNC: 135 MMOL/L — SIGNIFICANT CHANGE UP (ref 135–145)
WBC # BLD: 8.2 K/UL — SIGNIFICANT CHANGE UP (ref 4.8–10.8)
WBC # FLD AUTO: 8.2 K/UL — SIGNIFICANT CHANGE UP (ref 4.8–10.8)

## 2018-10-24 PROCEDURE — 80048 BASIC METABOLIC PNL TOTAL CA: CPT

## 2018-10-24 PROCEDURE — 85610 PROTHROMBIN TIME: CPT

## 2018-10-24 PROCEDURE — 99152 MOD SED SAME PHYS/QHP 5/>YRS: CPT

## 2018-10-24 PROCEDURE — C1725: CPT

## 2018-10-24 PROCEDURE — C1894: CPT

## 2018-10-24 PROCEDURE — 93454 CORONARY ARTERY ANGIO S&I: CPT | Mod: 59

## 2018-10-24 PROCEDURE — C1874: CPT

## 2018-10-24 PROCEDURE — 93005 ELECTROCARDIOGRAM TRACING: CPT

## 2018-10-24 PROCEDURE — 86901 BLOOD TYPING SEROLOGIC RH(D): CPT

## 2018-10-24 PROCEDURE — 86850 RBC ANTIBODY SCREEN: CPT

## 2018-10-24 PROCEDURE — 36415 COLL VENOUS BLD VENIPUNCTURE: CPT

## 2018-10-24 PROCEDURE — C1769: CPT

## 2018-10-24 PROCEDURE — C1760: CPT

## 2018-10-24 PROCEDURE — 85027 COMPLETE CBC AUTOMATED: CPT

## 2018-10-24 PROCEDURE — 93010 ELECTROCARDIOGRAM REPORT: CPT

## 2018-10-24 PROCEDURE — C9600: CPT | Mod: RC

## 2018-10-24 PROCEDURE — 99153 MOD SED SAME PHYS/QHP EA: CPT

## 2018-10-24 PROCEDURE — 82962 GLUCOSE BLOOD TEST: CPT

## 2018-10-24 PROCEDURE — C1887: CPT

## 2018-10-24 PROCEDURE — 86900 BLOOD TYPING SEROLOGIC ABO: CPT

## 2018-10-24 RX ADMIN — Medication 25 MILLIGRAM(S): at 09:37

## 2018-10-24 RX ADMIN — AMLODIPINE BESYLATE 10 MILLIGRAM(S): 2.5 TABLET ORAL at 05:24

## 2018-10-24 RX ADMIN — CLOPIDOGREL BISULFATE 75 MILLIGRAM(S): 75 TABLET, FILM COATED ORAL at 10:21

## 2018-10-24 RX ADMIN — Medication 325 MILLIGRAM(S): at 10:21

## 2018-10-24 RX ADMIN — SPIRONOLACTONE 25 MILLIGRAM(S): 25 TABLET, FILM COATED ORAL at 05:24

## 2018-10-24 NOTE — PROGRESS NOTE ADULT - SUBJECTIVE AND OBJECTIVE BOX
SUBJECTIVE:  Cardiology NP F/U note:  SP: [facility] which revealed:  staged PCI/CELE x2 to distal and mid RCA  denies complaints of chest pain/sob/dizziness/palps overnight   tolerated diet/ ambulating    MEDICATIONS:  amLODIPine   Tablet 10 milliGRAM(s) Oral daily  hydrALAZINE 25 milliGRAM(s) Oral two times a day  metoprolol succinate ER 25 milliGRAM(s) Oral daily  spironolactone 25 milliGRAM(s) Oral daily  aspirin 325 milliGRAM(s) Oral daily  atorvastatin 10 milliGRAM(s) Oral at bedtime  clopidogrel Tablet 75 milliGRAM(s) Oral daily  sodium chloride 0.9%. 250 milliLiter(s) IV Continuous <Continuous>        PHYSICAL EXAM:    T(C): 36.6 (10-24-18 @ 09:32), Max: 36.8 (10-24-18 @ 05:15)  HR: 64 (10-24-18 @ 09:32) (50 - 75)  BP: 110/56 (10-24-18 @ 09:32) (104/60 - 125/55)  RR: 16 (10-24-18 @ 09:32) (16 - 20)  SpO2: 99% (10-24-18 @ 09:32) (93% - 99%)  Wt(kg): --    I&O's Summary      Daily     Daily Weight in k.7 (24 Oct 2018 04:21)    Appearance: Normal	  HEENT:   Normal oral mucosa, 	  Lymphatic: No lymphadenopathy  Cardiovascular: Normal S1 S2, RRR 70's No JVD,  Respiratory: Lungs clear to auscultation	  Psychiatry: A & O x 3, Mood & affect appropriate  Gastrointestinal:  Soft, Non-tender, + BS	  Skin: No rashes, No ecchymoses, No cyanosis  Neurologic: Non-focal  Extremities: Normal range of motion, No clubbing, cyanosis or edema Left groin soft no hematoma/ dressing removed  Right groin is stable from prior procedure.   Vascular: Peripheral pulses palpable 2+ bilaterally    TELEMETRY: RSR 70's / no events on tele	    ECG:  	 RSR B 56 Normal record  RADIOLOGY:   DIAGNOSTIC TESTING:  [ ] Echocardiogram:  [ X]  Catheterization:  < from: Cardiac Cath Lab - Adult (10.23.18 @ 13:13) >  VENTRICLES: EF was not assessed.  CORONARY VESSELS: The coronary circulation is right dominant.  LM:   --  LM: Normal.  LAD:   --  Proximal LAD: There was a tubular 0 % stenosis at the site of a  prior stent.  --  Mid LAD: There was a tubular 0 % stenosis at the site of a prior stent.  CX:   --  Circumflex: Normal.  RI:   --  Ramus intermedius: Normal.  RCA:   --  Mid RCA: There was a tubular 85 % stenosis.  --  Distal RCA: There was a discrete 85 % stenosis.  COMPLICATIONS: There were no complications.  SUMMARY:  1ST LESION INTERVENTIONS: A successful drug-eluting stent was performed on  the 85 % lesion in the distal RCA. Following intervention there was an  excellent angiographic appearance with a 0 % residual stenosis.  2ND LESION INTERVENTIONS: A successful drug-eluting stent was performed on  the 85 % lesion in the mid RCA. Following intervention there was an  excellent angiographic appearance with a 0 % residual stenosis.  INTERVENTIONAL RECOMMENDATIONS: Continue clopidogrel (Plavix), 75 mg, PO,  daily. Continue aspirin, 81 mg, PO, daily.    < end of copied text >    [ ] Stress Test:    OTHER: 	    LABS:	 	    CARDIAC MARKERS:                                  12.9   8.2   )-----------( 269      ( 24 Oct 2018 06:10 )             39.8     10-    135  |  99  |  27.0<H>  ----------------------------<  141<H>  4.6   |  26.0  |  0.83    Ca    9.1      24 Oct 2018 06:10    ASSESSMENT:  67M presents for staged PCI /SP [facility] 10/16/18 with CELE mLAD/ recent abn stress test  Sp: [facility] 10/23/18 CELE x 2 to RCA   HX; CVA/ CAD with stents/ HTN/HKD/DM2  Hemodynamically stable overnight ekg and labs reviewed no arrhymias    Plan:  Continue current meds ASA/ Plavix/BB / statin  stable for d/c home  med compliance/ groin care and follow up discussed SUBJECTIVE:  Cardiology NP F/U note:  SP: [facility] which revealed:  staged PCI/CELE x2 to distal and mid RCA  denies complaints of chest pain/sob/dizziness/palps overnight   tolerated diet/ ambulating    MEDICATIONS:  amLODIPine   Tablet 10 milliGRAM(s) Oral daily  hydrALAZINE 25 milliGRAM(s) Oral two times a day  metoprolol succinate ER 25 milliGRAM(s) Oral daily  spironolactone 25 milliGRAM(s) Oral daily  aspirin 325 milliGRAM(s) Oral daily  atorvastatin 10 milliGRAM(s) Oral at bedtime  clopidogrel Tablet 75 milliGRAM(s) Oral daily  sodium chloride 0.9%. 250 milliLiter(s) IV Continuous <Continuous>        PHYSICAL EXAM:    T(C): 36.6 (10-24-18 @ 09:32), Max: 36.8 (10-24-18 @ 05:15)  HR: 64 (10-24-18 @ 09:32) (50 - 75)  BP: 110/56 (10-24-18 @ 09:32) (104/60 - 125/55)  RR: 16 (10-24-18 @ 09:32) (16 - 20)  SpO2: 99% (10-24-18 @ 09:32) (93% - 99%)  Wt(kg): --    I&O's Summary      Daily     Daily Weight in k.7 (24 Oct 2018 04:21)    Appearance: Normal	  HEENT:   Normal oral mucosa, 	  Lymphatic: No lymphadenopathy  Cardiovascular: Normal S1 S2, RRR 70's No JVD,  Respiratory: Lungs clear to auscultation	  Psychiatry: A & O x 3, Mood & affect appropriate  Gastrointestinal:  Soft, Non-tender, + BS	  Skin: No rashes, No ecchymoses, No cyanosis  Neurologic: Non-focal  Extremities: Normal range of motion, No clubbing, cyanosis or edema Left groin soft no hematoma/ dressing removed  Right groin is stable from prior procedure.   Vascular: Peripheral pulses palpable 2+ bilaterally    TELEMETRY: RSR 70's / no events on tele	    ECG:  	 RSR B 56 Normal record  RADIOLOGY:   DIAGNOSTIC TESTING:  [ ] Echocardiogram:  [ X]  Catheterization:  < from: Cardiac Cath Lab - Adult (10.23.18 @ 13:13) >  VENTRICLES: EF was not assessed.  CORONARY VESSELS: The coronary circulation is right dominant.  LM:   --  LM: Normal.  LAD:   --  Proximal LAD: There was a tubular 0 % stenosis at the site of a  prior stent.  --  Mid LAD: There was a tubular 0 % stenosis at the site of a prior stent.  CX:   --  Circumflex: Normal.  RI:   --  Ramus intermedius: Normal.  RCA:   --  Mid RCA: There was a tubular 85 % stenosis.  --  Distal RCA: There was a discrete 85 % stenosis.  COMPLICATIONS: There were no complications.  SUMMARY:  1ST LESION INTERVENTIONS: A successful drug-eluting stent was performed on  the 85 % lesion in the distal RCA. Following intervention there was an  excellent angiographic appearance with a 0 % residual stenosis.  2ND LESION INTERVENTIONS: A successful drug-eluting stent was performed on  the 85 % lesion in the mid RCA. Following intervention there was an  excellent angiographic appearance with a 0 % residual stenosis.  INTERVENTIONAL RECOMMENDATIONS: Continue clopidogrel (Plavix), 75 mg, PO,  daily. Continue aspirin, 81 mg, PO, daily.    < end of copied text >    [ ] Stress Test:    OTHER: 	    LABS:	 	    CARDIAC MARKERS:                                  12.9   8.2   )-----------( 269      ( 24 Oct 2018 06:10 )             39.8     10-    135  |  99  |  27.0<H>  ----------------------------<  141<H>  4.6   |  26.0  |  0.83    Ca    9.1      24 Oct 2018 06:10    ASSESSMENT:  67M presents for staged PCI /SP [facility] 10/16/18 with CELE mLAD/ recent abn stress test  Sp: [facility] 10/23/18 CELE x 2 to RCA   HX; CVA/ CAD with stents/ HTN/HKD/DM2  Hemodynamically stable overnight ekg and labs reviewed no arrhymias    Plan:  Continue current meds ASA/ Plavix/BB / statin/ACE resumed  stable for d/c home  med compliance/ groin care and follow up discussed

## 2020-10-21 NOTE — PATIENT PROFILE ADULT - COMPLETE THE FOLLOWING IF THE PATIENT REFUSES THE INFLUENZA VACCINE:
1st attempt to call regarding COVID-19 Virtual Care Program. Left voicemail with [phone] number to return call. Will attempt call again.    If patient returns this call, please do not send a message to the person that called them. Please follow Knowledgebase   Workflows.    
Risks/benefits discussed with patient/surrogate

## 2021-03-19 NOTE — PROGRESS NOTE ADULT - PROBLEM/PLAN-1
DISPLAY PLAN FREE TEXT Follow up with Dr. Navarrete within 1 week.  Follow up with Dr. Nunez within 1 week.

## 2022-11-15 ENCOUNTER — DOCTOR'S OFFICE (OUTPATIENT)
Dept: URBAN - METROPOLITAN AREA CLINIC 162 | Facility: CLINIC | Age: 72
Setting detail: OPHTHALMOLOGY
End: 2022-11-15
Payer: MEDICARE

## 2022-11-15 DIAGNOSIS — E11.9: ICD-10-CM

## 2022-11-15 DIAGNOSIS — H52.6: ICD-10-CM

## 2022-11-15 DIAGNOSIS — H43.813: ICD-10-CM

## 2022-11-15 DIAGNOSIS — Z96.1: ICD-10-CM

## 2022-11-15 PROCEDURE — 92004 COMPRE OPH EXAM NEW PT 1/>: CPT | Performed by: OPHTHALMOLOGY

## 2022-11-15 ASSESSMENT — VISUAL ACUITY
OD_BCVA: 20/50+
OS_BCVA: 20/60

## 2022-11-15 ASSESSMENT — CONFRONTATIONAL VISUAL FIELD TEST (CVF)
OD_FINDINGS: FULL
OS_FINDINGS: FULL

## 2022-11-15 ASSESSMENT — KERATOMETRY
OS_AXISANGLE_DEGREES: 004
OS_K2POWER_DIOPTERS: 45.25
OD_AXISANGLE_DEGREES: 001
OD_K2POWER_DIOPTERS: 44.75
OD_K1POWER_DIOPTERS: 44.00
OS_K1POWER_DIOPTERS: 44.75

## 2022-11-15 ASSESSMENT — REFRACTION_AUTOREFRACTION
OD_SPHERE: +1.75
OD_CYLINDER: -1.50
OS_AXIS: 084
OS_CYLINDER: -1.00
OS_SPHERE: +1.25
OD_AXIS: 095

## 2022-11-15 ASSESSMENT — REFRACTION_CURRENTRX
OD_OVR_VA: 20/
OS_OVR_VA: 20/

## 2022-11-15 ASSESSMENT — SPHEQUIV_DERIVED
OD_SPHEQUIV: 1
OS_SPHEQUIV: 0.75

## 2022-11-15 ASSESSMENT — AXIALLENGTH_DERIVED
OD_AL: 22.9017
OS_AL: 22.7778

## 2023-07-27 NOTE — PATIENT PROFILE ADULT - NSPROMUTANXFEARADDRESSFT_GEN_A_NUR
July 27, 2023       Patient: KENNEY CISSE   YOB: 1993   Date of Visit: 7/27/2023         To Whom It May Concern:    In my medical opinion, I recommend that KENNEY CISSE be excused from work 7/26/2023 through 7/28/2023 due to illness.     If you have any questions or concerns, please don't hesitate to call [phone]          Sincerely,          [email or web address].  Electronically Signed     
n/a

## 2023-09-19 ENCOUNTER — DOCTOR'S OFFICE (OUTPATIENT)
Dept: URBAN - METROPOLITAN AREA CLINIC 162 | Facility: CLINIC | Age: 73
Setting detail: OPHTHALMOLOGY
End: 2023-09-19
Payer: MEDICARE

## 2023-09-19 DIAGNOSIS — H43.813: ICD-10-CM

## 2023-09-19 DIAGNOSIS — E11.9: ICD-10-CM

## 2023-09-19 DIAGNOSIS — Z96.1: ICD-10-CM

## 2023-09-19 DIAGNOSIS — H52.6: ICD-10-CM

## 2023-09-19 PROCEDURE — 92014 COMPRE OPH EXAM EST PT 1/>: CPT | Performed by: OPHTHALMOLOGY

## 2023-09-19 ASSESSMENT — KERATOMETRY
OD_AXISANGLE_DEGREES: 003
OD_K1POWER_DIOPTERS: 44.25
OS_K2POWER_DIOPTERS: 45.25
OS_K1POWER_DIOPTERS: 45.00
OS_AXISANGLE_DEGREES: 007
OD_K2POWER_DIOPTERS: 45.00

## 2023-09-19 ASSESSMENT — VISUAL ACUITY
OS_BCVA: 20/30+
OD_BCVA: 20/30+

## 2023-09-19 ASSESSMENT — AXIALLENGTH_DERIVED
OD_AL: 22.8611
OS_AL: 22.7804

## 2023-09-19 ASSESSMENT — REFRACTION_AUTOREFRACTION
OS_CYLINDER: -0.75
OD_SPHERE: +1.25
OD_CYLINDER: -0.75
OD_AXIS: 099
OS_AXIS: 084
OS_SPHERE: +1.00

## 2023-09-19 ASSESSMENT — REFRACTION_CURRENTRX
OD_OVR_VA: 20/
OS_OVR_VA: 20/

## 2023-09-19 ASSESSMENT — SPHEQUIV_DERIVED
OD_SPHEQUIV: 0.875
OS_SPHEQUIV: 0.625

## 2023-09-19 ASSESSMENT — CONFRONTATIONAL VISUAL FIELD TEST (CVF)
OS_FINDINGS: FULL
OD_FINDINGS: FULL

## 2024-10-08 ENCOUNTER — DOCTOR'S OFFICE (OUTPATIENT)
Dept: URBAN - METROPOLITAN AREA CLINIC 162 | Facility: CLINIC | Age: 74
Setting detail: OPHTHALMOLOGY
End: 2024-10-08
Payer: MEDICARE

## 2024-10-08 DIAGNOSIS — H43.813: ICD-10-CM

## 2024-10-08 DIAGNOSIS — Z96.1: ICD-10-CM

## 2024-10-08 DIAGNOSIS — E11.9: ICD-10-CM

## 2024-10-08 PROCEDURE — 92014 COMPRE OPH EXAM EST PT 1/>: CPT | Performed by: OPHTHALMOLOGY

## 2024-10-08 ASSESSMENT — REFRACTION_CURRENTRX
OS_OVR_VA: 20/
OD_OVR_VA: 20/

## 2024-10-08 ASSESSMENT — KERATOMETRY
OD_K2POWER_DIOPTERS: 45.00
OS_K2POWER_DIOPTERS: 45.25
OD_K1POWER_DIOPTERS: 44.25
OS_AXISANGLE_DEGREES: 180
OS_K1POWER_DIOPTERS: 44.50
OD_AXISANGLE_DEGREES: 170

## 2024-10-08 ASSESSMENT — REFRACTION_AUTOREFRACTION
OD_CYLINDER: -1.25
OS_SPHERE: +1.50
OD_AXIS: 085
OS_AXIS: 085
OD_SPHERE: +1.50
OS_CYLINDER: -1.25

## 2024-10-08 ASSESSMENT — TONOMETRY
OD_IOP_MMHG: 14
OS_IOP_MMHG: 14

## 2024-10-08 ASSESSMENT — CONFRONTATIONAL VISUAL FIELD TEST (CVF)
OD_FINDINGS: FULL
OS_FINDINGS: FULL

## 2024-10-08 ASSESSMENT — VISUAL ACUITY
OS_BCVA: 20/40+2
OD_BCVA: 20/30